# Patient Record
(demographics unavailable — no encounter records)

---

## 2024-10-18 NOTE — PHYSICAL EXAM
[Fully active, able to carry on all pre-disease performance without restriction] : Status 0 - Fully active, able to carry on all pre-disease performance without restriction [Normal] : affect appropriate [de-identified] : room air

## 2024-10-18 NOTE — REASON FOR VISIT
[Follow-Up Visit] : a follow-up visit for [Parent] : parent [FreeTextEntry2] : Donor mobilization appt

## 2024-10-18 NOTE — ASSESSMENT
[FreeTextEntry1] : CBC stable.   David will self-administer Zarxio 960mcg SQ daily 7/28/24- 8/1/24.   Stem cell collection and Shiley catheter placement on 10/22/24   Explained to patient how to self-administer Zarxio injections. Potentional side effects explained.   First dose administered via subQ injection to left abd, LOT [id number]; exp 2026-NOV and [id number];EXP 2026-AUG Recommended tylenol prn and claritin daily 10/18-10/22  Avoid advil, aleve and aspirin.   Questions and concerns addressed. Reassurance provided.   Latoya Sims FN-BC

## 2024-10-18 NOTE — HISTORY OF PRESENT ILLNESS
[de-identified] : Patient presents today for growth factor teaching in preparation for stem cell mobilization.       [de-identified] : Patient presents for growth factor teaching in preparation for stem cell mobilization. Overall, well and has no complaints. Denies fever, rash, bone pain, SOB, chest pain, vomiting or any signs of bleeding.